# Patient Record
Sex: FEMALE | Race: BLACK OR AFRICAN AMERICAN | NOT HISPANIC OR LATINO | URBAN - METROPOLITAN AREA
[De-identification: names, ages, dates, MRNs, and addresses within clinical notes are randomized per-mention and may not be internally consistent; named-entity substitution may affect disease eponyms.]

---

## 2024-01-19 ENCOUNTER — EMERGENCY (EMERGENCY)
Facility: HOSPITAL | Age: 54
LOS: 1 days | Discharge: HOME | End: 2024-01-19
Attending: EMERGENCY MEDICINE | Admitting: EMERGENCY MEDICINE
Payer: COMMERCIAL

## 2024-01-19 VITALS
OXYGEN SATURATION: 100 % | SYSTOLIC BLOOD PRESSURE: 110 MMHG | HEART RATE: 67 BPM | DIASTOLIC BLOOD PRESSURE: 74 MMHG | RESPIRATION RATE: 18 BRPM | TEMPERATURE: 98 F

## 2024-01-19 LAB
ANION GAP SERPL CALC-SCNC: 12 MMOL/L — SIGNIFICANT CHANGE UP (ref 7–14)
ANISOCYTOSIS BLD QL: SIGNIFICANT CHANGE UP
APTT BLD: 31.3 SEC — SIGNIFICANT CHANGE UP (ref 24.5–35.6)
BASOPHILS # BLD AUTO: 0 K/UL — SIGNIFICANT CHANGE UP (ref 0–0.2)
BASOPHILS NFR BLD AUTO: 0 % — SIGNIFICANT CHANGE UP (ref 0–2)
BLD GP AB SCN SERPL QL: NEGATIVE — SIGNIFICANT CHANGE UP
BUN SERPL-MCNC: 10 MG/DL — SIGNIFICANT CHANGE UP (ref 7–23)
CALCIUM SERPL-MCNC: 9.3 MG/DL — SIGNIFICANT CHANGE UP (ref 8.4–10.5)
CHLORIDE SERPL-SCNC: 105 MMOL/L — SIGNIFICANT CHANGE UP (ref 98–107)
CO2 SERPL-SCNC: 24 MMOL/L — SIGNIFICANT CHANGE UP (ref 22–31)
CREAT SERPL-MCNC: 0.54 MG/DL — SIGNIFICANT CHANGE UP (ref 0.5–1.3)
EGFR: 111 ML/MIN/1.73M2 — SIGNIFICANT CHANGE UP
EOSINOPHIL # BLD AUTO: 0.08 K/UL — SIGNIFICANT CHANGE UP (ref 0–0.5)
EOSINOPHIL NFR BLD AUTO: 1.7 % — SIGNIFICANT CHANGE UP (ref 0–6)
GIANT PLATELETS BLD QL SMEAR: PRESENT — SIGNIFICANT CHANGE UP
GLUCOSE SERPL-MCNC: 89 MG/DL — SIGNIFICANT CHANGE UP (ref 70–99)
HCG SERPL-ACNC: <1 MIU/ML — SIGNIFICANT CHANGE UP
HCT VFR BLD CALC: 25.8 % — LOW (ref 34.5–45)
HGB BLD-MCNC: 6.8 G/DL — CRITICAL LOW (ref 11.5–15.5)
HYPOCHROMIA BLD QL: SIGNIFICANT CHANGE UP
IANC: 3.29 K/UL — SIGNIFICANT CHANGE UP (ref 1.8–7.4)
INR BLD: 1.03 RATIO — SIGNIFICANT CHANGE UP (ref 0.85–1.18)
LYMPHOCYTES # BLD AUTO: 1.02 K/UL — SIGNIFICANT CHANGE UP (ref 1–3.3)
LYMPHOCYTES # BLD AUTO: 20.7 % — SIGNIFICANT CHANGE UP (ref 13–44)
MCHC RBC-ENTMCNC: 18.2 PG — LOW (ref 27–34)
MCHC RBC-ENTMCNC: 26.4 GM/DL — LOW (ref 32–36)
MCV RBC AUTO: 69.2 FL — LOW (ref 80–100)
MICROCYTES BLD QL: SIGNIFICANT CHANGE UP
MONOCYTES # BLD AUTO: 0.13 K/UL — SIGNIFICANT CHANGE UP (ref 0–0.9)
MONOCYTES NFR BLD AUTO: 2.6 % — SIGNIFICANT CHANGE UP (ref 2–14)
NEUTROPHILS # BLD AUTO: 3.67 K/UL — SIGNIFICANT CHANGE UP (ref 1.8–7.4)
NEUTROPHILS NFR BLD AUTO: 74.1 % — SIGNIFICANT CHANGE UP (ref 43–77)
OVALOCYTES BLD QL SMEAR: SLIGHT — SIGNIFICANT CHANGE UP
PLAT MORPH BLD: ABNORMAL
PLATELET # BLD AUTO: 372 K/UL — SIGNIFICANT CHANGE UP (ref 150–400)
PLATELET COUNT - ESTIMATE: NORMAL — SIGNIFICANT CHANGE UP
POIKILOCYTOSIS BLD QL AUTO: SIGNIFICANT CHANGE UP
POLYCHROMASIA BLD QL SMEAR: SIGNIFICANT CHANGE UP
POTASSIUM SERPL-MCNC: 3.8 MMOL/L — SIGNIFICANT CHANGE UP (ref 3.5–5.3)
POTASSIUM SERPL-SCNC: 3.8 MMOL/L — SIGNIFICANT CHANGE UP (ref 3.5–5.3)
PROTHROM AB SERPL-ACNC: 11.6 SEC — SIGNIFICANT CHANGE UP (ref 9.5–13)
RBC # BLD: 3.73 M/UL — LOW (ref 3.8–5.2)
RBC # FLD: 20.4 % — HIGH (ref 10.3–14.5)
RBC BLD AUTO: ABNORMAL
RH IG SCN BLD-IMP: POSITIVE — SIGNIFICANT CHANGE UP
RH IG SCN BLD-IMP: POSITIVE — SIGNIFICANT CHANGE UP
SODIUM SERPL-SCNC: 141 MMOL/L — SIGNIFICANT CHANGE UP (ref 135–145)
TROPONIN T, HIGH SENSITIVITY RESULT: <6 NG/L — SIGNIFICANT CHANGE UP
TSH SERPL-MCNC: 1.21 UIU/ML — SIGNIFICANT CHANGE UP (ref 0.27–4.2)
VARIANT LYMPHS # BLD: 0.9 % — SIGNIFICANT CHANGE UP (ref 0–6)
WBC # BLD: 4.95 K/UL — SIGNIFICANT CHANGE UP (ref 3.8–10.5)
WBC # FLD AUTO: 4.95 K/UL — SIGNIFICANT CHANGE UP (ref 3.8–10.5)

## 2024-01-19 PROCEDURE — 93010 ELECTROCARDIOGRAM REPORT: CPT

## 2024-01-19 PROCEDURE — 99223 1ST HOSP IP/OBS HIGH 75: CPT

## 2024-01-19 RX ORDER — PANTOPRAZOLE SODIUM 20 MG/1
40 TABLET, DELAYED RELEASE ORAL
Refills: 0 | Status: DISCONTINUED | OUTPATIENT
Start: 2024-01-19 | End: 2024-01-22

## 2024-01-19 NOTE — ED ADULT TRIAGE NOTE - CHIEF COMPLAINT QUOTE
Pt presents to ED ambulatory from home with c/o low hemoglobin 4.5, advised pt to come to ED for transfusion and further eval. Pt reports heavy menstrual cycle which is her baseline. Pt had been feeling weakness, lightheadedness and lethargy, reports symptoms have since resolved.

## 2024-01-19 NOTE — ED PROVIDER NOTE - CLINICAL SUMMARY MEDICAL DECISION MAKING FREE TEXT BOX
This is a 53-year-old female past medical history of heavy periods however she states have gotten better.  She presents today because she was called by her primary care doctor and told that her hemoglobin was low and that she needs a blood transfusion.  Patient states for the past month or so she has been feeling very weak getting lightheaded and having intermittent chest pain.  Patient currently feels well she was at work when she got this call.  She denies any current chest pain shortness of breath nausea vomiting abdominal pain.  No blood noted in stool.  Patient states that she has a history of anemia however never this low.  No history of blood transfusions in the past.

## 2024-01-19 NOTE — ED ADULT NURSE NOTE - NSFALLUNIVINTERV_ED_ALL_ED
Bed/Stretcher in lowest position, wheels locked, appropriate side rails in place/Call bell, personal items and telephone in reach/Instruct patient to call for assistance before getting out of bed/chair/stretcher/Non-slip footwear applied when patient is off stretcher/[place] to call system/Physically safe environment - no spills, clutter or unnecessary equipment/Purposeful proactive rounding/Room/bathroom lighting operational, light cord in reach

## 2024-01-19 NOTE — ED PROVIDER NOTE - NS_EDPROVIDERDISPOUSERTYPE_ED_A_ED
Attending Attestation (For Attendings USE Only)... You can access the FollowMyHealth Patient Portal offered by [facility] by registering at the following website: [email or web address]. By joining [facility]’s FollowMyHealth portal, you will also be able to view your health information using other applications (apps) compatible with our system.

## 2024-01-19 NOTE — ED ADULT NURSE NOTE - CHIEF COMPLAINT QUOTE
Critical Care Pt presents to ED ambulatory from home with c/o low hemoglobin 4.5, advised pt to come to ED for transfusion and further eval. Pt reports heavy menstrual cycle which is her baseline. Pt had been feeling weakness, lightheadedness and lethargy, reports symptoms have since resolved.

## 2024-01-19 NOTE — ED CDU PROVIDER INITIAL DAY NOTE - OBJECTIVE STATEMENT
53-year-old female with past medical history of heavy periods, anemia, GERD presents to ED for anemia.  Patient seen by her primary care physician and told her hemoglobin was low and sent to emergency room.  Patient states she has felt fatigue and general weakness for the past month.  Last heavy period was 2 months ago.  States her periods are regular as she is older.  Last colonoscopy 3 years ago and last endoscopy last year twice with no ulcers.  No melena or hematemesis.  No vomiting abdominal pain. 53-year-old female with past medical history of heavy periods, anemia, GERD presents to ED for anemia.  Patient seen by her primary care physician and told her hemoglobin was low and sent to emergency room.  Patient states she has felt fatigue and general weakness for the past month.  Last heavy period was 2 months ago.  States her periods are regular as she is older.  Last colonoscopy 3 years ago and last endoscopy last year twice with no ulcers.  No melena or hematemesis.  No vomiting abdominal pain. No cp, sob, palpitations, vaginal bleeding.

## 2024-01-19 NOTE — ED PROVIDER NOTE - OBJECTIVE STATEMENT
This is a 53-year-old female past medical history of heavy periods however she states have gotten better.  She presents today because she was called by her primary care doctor and told that her hemoglobin was low and that she needs a blood transfusion.  Patient states for the past month or so she has been feeling very weak getting lightheaded and having intermittent chest pain.  Patient currently feels well she was at work when she got this call.  She denies any current chest pain shortness of breath nausea vomiting abdominal pain.  No blood noted in stool.  Patient states that she has a history of anemia however never this low.  No history of blood transfusions in the past. This is a 53-year-old female past medical history of heavy periods however she states have gotten better.  She presents today because she was called by her primary care doctor and told that her hemoglobin was low and that she needs a blood transfusion.  Patient states for the past month or so she has been feeling very weak getting lightheaded and having intermittent chest pain.  Patient currently feels well she was at work when she got this call.  She denies any current chest pain shortness of breath nausea vomiting abdominal pain.  No blood noted in stool.  Patient states that she has a history of anemia however never this low.  No history of blood transfusions in the past.    Colonoscopy 3 years ago unremarkable, endoscopy x2 with no ulcers. No new bleeding/bruising.

## 2024-01-19 NOTE — ED PROVIDER NOTE - PHYSICAL EXAMINATION
General: Well appearing, well nourished, in no distress  Head: Normocephalic, atraumatic  Eyes: Conjunctiva clear, sclera non-icteric, EOM intact, PERRL  Mouth: Mucous membranes moist, no mucosal lesions.  Neck: Supple  Heart: Regular rate and rhythm, no murmur or gallop  Lungs: Clear to auscultation  Abdomen: Bowel sounds present, soft, no tenderness, non distended, no organomegaly, masses  Back: Spine normal without deformity or tenderness, no CVA tenderness  Extremities: No amputations or deformities, cyanosis, edema  Musculoskeletal: No crepitation, defects or decreased range of motion, strength intact throughout, pulses intact  Neurologic: No gross deficits normal gait  Psychiatric: Oriented X3, normal mood and affect  Skin: Warm,dry. no palmar pallor General: Well appearing, well nourished, in no distress  Head: Normocephalic, atraumatic  Eyes: Conjunctiva clear, sclera non-icteric, EOM intact, PERRL  Mouth: Mucous membranes moist, no mucosal lesions.  Neck: Supple  Heart: Regular rate and rhythm, no murmur or gallop  Lungs: Clear to auscultation  Abdomen: Bowel sounds present, soft, no tenderness, non distended, no organomegaly  Back: Spine normal without deformity or tenderness, no CVA tenderness  Extremities: No amputations or deformities, cyanosis, edema  Musculoskeletal: No crepitation, defects or decreased range of motion, strength intact throughout, pulses intact  Neurologic: No gross deficits normal gait  Psychiatric: Oriented X3, normal mood and affect  Skin: Warm,dry. no palmar pallor

## 2024-01-19 NOTE — ED PROVIDER NOTE - PROGRESS NOTE DETAILS
Patient still asymptomatic hemoglobin has come back at 6.8.  Patient is ordered for 1 unit of PRBCs.  Risk versus benefit of blood transfusion discussed with patient. Blood consent signed witnessed and placed in chart.

## 2024-01-19 NOTE — ED ADULT NURSE NOTE - OBJECTIVE STATEMENT
pt received to intake room #4 with c/o Low h/h. pt states she went to her PMD for fatigue x 1 month, and was found to have a low Hgb. pt denies active bleeding at present. rr even an unlabored. pt able to ambulate independently with steady gait in heeled shoes. pt hydrating in exam room food that was delivered. IV placed labs drawn and sent. pt to go to RW.

## 2024-01-20 VITALS
TEMPERATURE: 98 F | SYSTOLIC BLOOD PRESSURE: 124 MMHG | HEART RATE: 72 BPM | DIASTOLIC BLOOD PRESSURE: 74 MMHG | OXYGEN SATURATION: 100 % | RESPIRATION RATE: 16 BRPM

## 2024-01-20 LAB
BASOPHILS # BLD AUTO: 0.04 K/UL — SIGNIFICANT CHANGE UP (ref 0–0.2)
BASOPHILS NFR BLD AUTO: 0.8 % — SIGNIFICANT CHANGE UP (ref 0–2)
EOSINOPHIL # BLD AUTO: 0.14 K/UL — SIGNIFICANT CHANGE UP (ref 0–0.5)
EOSINOPHIL NFR BLD AUTO: 2.9 % — SIGNIFICANT CHANGE UP (ref 0–6)
HCT VFR BLD CALC: 28.5 % — LOW (ref 34.5–45)
HGB BLD-MCNC: 8.2 G/DL — LOW (ref 11.5–15.5)
IANC: 3.18 K/UL — SIGNIFICANT CHANGE UP (ref 1.8–7.4)
IMM GRANULOCYTES NFR BLD AUTO: 0 % — SIGNIFICANT CHANGE UP (ref 0–0.9)
LYMPHOCYTES # BLD AUTO: 1.06 K/UL — SIGNIFICANT CHANGE UP (ref 1–3.3)
LYMPHOCYTES # BLD AUTO: 22.1 % — SIGNIFICANT CHANGE UP (ref 13–44)
MCHC RBC-ENTMCNC: 21.1 PG — LOW (ref 27–34)
MCHC RBC-ENTMCNC: 28.8 GM/DL — LOW (ref 32–36)
MCV RBC AUTO: 73.3 FL — LOW (ref 80–100)
MONOCYTES # BLD AUTO: 0.37 K/UL — SIGNIFICANT CHANGE UP (ref 0–0.9)
MONOCYTES NFR BLD AUTO: 7.7 % — SIGNIFICANT CHANGE UP (ref 2–14)
NEUTROPHILS # BLD AUTO: 3.18 K/UL — SIGNIFICANT CHANGE UP (ref 1.8–7.4)
NEUTROPHILS NFR BLD AUTO: 66.5 % — SIGNIFICANT CHANGE UP (ref 43–77)
NRBC # BLD: 0 /100 WBCS — SIGNIFICANT CHANGE UP (ref 0–0)
NRBC # FLD: 0 K/UL — SIGNIFICANT CHANGE UP (ref 0–0)
PLATELET # BLD AUTO: 296 K/UL — SIGNIFICANT CHANGE UP (ref 150–400)
RBC # BLD: 3.89 M/UL — SIGNIFICANT CHANGE UP (ref 3.8–5.2)
RBC # FLD: 22 % — HIGH (ref 10.3–14.5)
WBC # BLD: 4.79 K/UL — SIGNIFICANT CHANGE UP (ref 3.8–10.5)
WBC # FLD AUTO: 4.79 K/UL — SIGNIFICANT CHANGE UP (ref 3.8–10.5)

## 2024-01-20 PROCEDURE — 99239 HOSP IP/OBS DSCHRG MGMT >30: CPT

## 2024-01-20 RX ADMIN — PANTOPRAZOLE SODIUM 40 MILLIGRAM(S): 20 TABLET, DELAYED RELEASE ORAL at 06:45

## 2024-01-20 NOTE — ED CDU PROVIDER DISPOSITION NOTE - CLINICAL COURSE
NOVA Batse:  53-year-old female with a history of anemia, GERD, heavy menses presented to the emergency department for blood transfusion due to low H&H on outpatient labs.  Patient complained of fatigue and generalized weakness.  Patient placed in CDU for blood transfusion H&H improved from 6.8/25.8 to 8.2/28.5.  Patient feels improved, ambulating without difficulty, tolerating p.o., patient wants to go home, discharge and results reviewed with patient.

## 2024-01-20 NOTE — ED CDU PROVIDER SUBSEQUENT DAY NOTE - ATTENDING APP SHARED VISIT CONTRIBUTION OF CARE
Patient is a 53-year-old female with a history of GERD, anemia here in CDU for blood transfusion for symptomatic anemia.  Patient reports a history of heavy.'s.  She denies prior need for blood transfusion.  She was found to have a low hemoglobin on outpatient labs.  In the ED, initial hemoglobin was 6.8.  Patient is s/p 2 units PRBCs.  She was reassessed this morning.      VS noted  Gen. no acute distress, Non toxic   HEENT: EOMI, mmm  Lungs: CTAB/L no C/ W /R   CVS: RRR   Abd; Soft non tender, non distended   Ext: no edema  Skin: no rash  Neuro AAOx3 non focal clear speech  a/p:  symptomatic anemia–now s/p 2 units PRBCs.  Repeat Hgb is 8.2.  I discussed results in detail patient.  She is not having any menstrual bleeding at this time.  Patient reports she has an OB/GYN doctor with whom she can follow-up with.  Return precautions discussed in detail.  Patient states she feels well and would like to be discharged.  Apple Palm MD

## 2024-01-20 NOTE — ED CDU PROVIDER DISPOSITION NOTE - ATTENDING CONTRIBUTION TO CARE
Patient is a 53-year-old female with a history of GERD, anemia here in CDU for blood transfusion for symptomatic anemia.  Patient reports a history of heavy.'s.  She denies prior need for blood transfusion.  She was found to have a low hemoglobin on outpatient labs.  In the ED, initial hemoglobin was 6.8.  Patient is s/p 2 units PRBCs.  She was reassessed this morning.      VS noted  Gen. no acute distress, Non toxic   HEENT: EOMI, mmm  Lungs: CTAB/L no C/ W /R   CVS: RRR   Abd; Soft non tender, non distended   Ext: no edema  Skin: no rash  Neuro AAOx3 non focal clear speech  a/p:  symptomatic anemia–now s/p 2 units PRBCs.  Repeat Hgb is 8.2.  I discussed results in detail patient.  She is not having any menstrual bleeding at this time.  Patient reports she has an OB/GYN doctor with whom she can follow-up with.  Return precautions discussed in detail.  Patient states she feels well and would like to be discharged.  - Arpita CONKLIN.

## 2024-01-20 NOTE — ED CDU PROVIDER SUBSEQUENT DAY NOTE - PHYSICAL EXAMINATION
CONSTITUTIONAL:  Well appearing, awake, alert, oriented to person, place, time/situation and in no apparent distress.  Pt. is objectively comfortable appearing and verbalizing in full, clear, effortless sentences.  ENMT: NC/AT.  Airway patent.  Nasal mucosa clear.  Moist mucous membranes.  Neck supple.  EYES:  Clear OU.  CARDIAC:  Normal rate, regular rhythm.  Heart sounds S1 S2.  No murmurs, gallops, or rubs.  RESPIRATORY:  Breath sounds clear and equal bilaterally.  No wheezes, no rales, no rhonchi.  GASTROINTESTINAL:  Abdomen soft, non-distended, non-tender.  No rebound, no guarding.  NEUROLOGICAL:  Alert and oriented to person/place/time/situation.  No focal deficits; no tremors noted.   MUSCULOSKELETAL:  Range of motion is not limited.    SKIN:  Skin color unremarkable.  Skin warm, dry.  PSYCHIATRIC:  Alert and oriented to person/place/time/situation.  Mood and affect WNL.  No apparent risk to self or others.

## 2024-01-20 NOTE — ED CDU PROVIDER SUBSEQUENT DAY NOTE - PROGRESS NOTE DETAILS
NOVA Bates:  53-year-old female with a history of anemia, GERD, heavy menses presented to the emergency department for blood transfusion due to low H&H on outpatient labs.  Patient complained of fatigue and generalized weakness.  Patient placed in CDU for blood transfusion H&H improved from 6.8/25.8 to 8.2/28.5.  Patient feels improved, ambulating without difficulty, tolerating p.o., patient wants to go home, discharge and results reviewed with patient.

## 2024-01-20 NOTE — ED CDU PROVIDER SUBSEQUENT DAY NOTE - HISTORY
53-year-old female with past medical history of heavy periods, anemia, GERD presents to ED for anemia.  Patient seen by her primary care physician and told her hemoglobin was low and sent to emergency room.  Patient states she has felt fatigue and general weakness for the past month.  Last heavy period was 2 months ago.  States her periods are regular as she is older.  Last colonoscopy 3 years ago and last endoscopy last year twice with no ulcers.  No melena or hematemesis.  No vomiting abdominal pain. No cp, sob, palpitations, vaginal bleeding.  In the ED, VSS, pt afebrile.  WBC 4.95, Hb 6.8, Hct 25.8, platelets 372.  coags/BMP unremarkable.  HCG <1.0.  TSH 1.21.  Troponin <6.  Pt was consented for PRBC transfusion and sent to CDU for plan: Transfuse PRBCs per orders, repeat CBC, supportive care, general observation care / monitoring.  In the interim, pt objectively noted to be resting comfortably; pt has been clinically stable; no issues thus far.  Pt rec'd 2 units PRBCs, repeat CBC ordered.

## 2024-01-20 NOTE — ED CDU PROVIDER DISPOSITION NOTE - PATIENT PORTAL LINK FT
You can access the FollowMyHealth Patient Portal offered by [facility] by registering at the following website: [email or web address]. By joining [facility]’s FollowMyHealth portal, you will also be able to view your health information using other applications (apps) compatible with our system.

## 2024-01-20 NOTE — ED CDU PROVIDER DISPOSITION NOTE - NSFOLLOWUPINSTRUCTIONS_ED_ALL_ED_FT
Follow up with your Primary Medical Doctor in 1-2 days.  Follow up with hematology in 1-2 days.(see attached list)  Follow up with your OB/GYN in 1-2 days.  Rest.  Stay well hydrated.  Return to the ER for any persistent/worsening or new symptoms weakness, dizziness, chest pain, shortness of breath, bleeding or any concerning symptoms.

## 2024-03-18 NOTE — ED CDU PROVIDER INITIAL DAY NOTE - CLINICAL SUMMARY MEDICAL DECISION MAKING FREE TEXT BOX
Adult
53-year-old female with past medical history of heavy periods, anemia, GERD presents to ED for anemia.  Patient seen by her primary care physician and told her hemoglobin was low and sent to emergency room.  Patient states she has felt fatigue and general weakness for the past month.  Last heavy period was 2 months ago.  States her periods are regular as she is older.  Last colonoscopy 3 years ago and last endoscopy last year twice with no ulcers.  No melena or hematemesis.  No vomiting abdominal pain. pt with hg of 6.8. sent to cdu for blood transfusion. plan to transfuse 1 unit and repeat CBC.

## 2025-03-26 NOTE — ED ADULT NURSE NOTE - PAIN: PRESENCE, MLM
Diagnosis: DVT, PE, Factor 5 Leiden Heterozygous.  Goal is 2.0 - 3.0   Referral Provider: Dr. Curiel  Service to Anticoagulation order expires: 9/10/25    AVS Declined. Patient able to correctly repeat back instructions.     Patient came to clinic for anticoagulation monitoring.  Last INR on 1/3/25 was 2.9.  Dose maintained.   Today's INR is 2.4 and is within goal range.    Current warfarin total weekly dose of 65 mg verified.  Informed the INR result is within therapeutic range and instructed to maintain current dose per protocol. Discussed dose and return date of 6/18/25 for next INR. See Anticoagulation flowsheet.    Dr. Gutierrez is in the office today supervising the treatment.    Instructed to contact the clinic with any unusual bleeding or bruising, any changes in medications, diet, health status, lifestyle, or any other changes, questions or concerns. Verbalized understanding of all discussed.     
denies pain/discomfort (Rating = 0)